# Patient Record
Sex: FEMALE | Race: BLACK OR AFRICAN AMERICAN | NOT HISPANIC OR LATINO | Employment: FULL TIME | URBAN - METROPOLITAN AREA
[De-identification: names, ages, dates, MRNs, and addresses within clinical notes are randomized per-mention and may not be internally consistent; named-entity substitution may affect disease eponyms.]

---

## 2020-09-28 ENCOUNTER — TELEPHONE (OUTPATIENT)
Dept: OBSTETRICS AND GYNECOLOGY | Facility: CLINIC | Age: 25
End: 2020-09-28

## 2020-09-28 DIAGNOSIS — Z36.3 ENCOUNTER FOR ANTENATAL SCREENING FOR MALFORMATION USING ULTRASOUND: Primary | ICD-10-CM

## 2020-09-28 NOTE — TELEPHONE ENCOUNTER
----- Message from Jean-Pierre Jeffrey sent at 9/28/2020 10:12 AM CDT -----  Who Called: STELLA PONCE [[id number]]     Date of Positive Preg Test: 09/28/20     1st day of Last Menstrual Cycle:08/01/2020     List Any Difficulties: vomiting      What Number to Call Back: [phone]

## 2021-11-16 ENCOUNTER — OFFICE VISIT (OUTPATIENT)
Dept: URGENT CARE | Facility: CLINIC | Age: 26
End: 2021-11-16
Payer: MEDICAID

## 2021-11-16 VITALS
OXYGEN SATURATION: 98 % | WEIGHT: 293 LBS | TEMPERATURE: 99 F | HEIGHT: 62 IN | SYSTOLIC BLOOD PRESSURE: 126 MMHG | BODY MASS INDEX: 53.92 KG/M2 | RESPIRATION RATE: 18 BRPM | HEART RATE: 66 BPM | DIASTOLIC BLOOD PRESSURE: 83 MMHG

## 2021-11-16 DIAGNOSIS — Z20.2 EXPOSURE TO STD: Primary | ICD-10-CM

## 2021-11-16 DIAGNOSIS — Z20.2 CHLAMYDIA CONTACT: ICD-10-CM

## 2021-11-16 DIAGNOSIS — Z20.2 STD EXPOSURE: ICD-10-CM

## 2021-11-16 DIAGNOSIS — J30.89 ALLERGIC RHINITIS DUE TO OTHER ALLERGIC TRIGGER, UNSPECIFIED SEASONALITY: ICD-10-CM

## 2021-11-16 LAB
B-HCG UR QL: NEGATIVE
BILIRUB UR QL STRIP: NEGATIVE
CTP QC/QA: YES
GLUCOSE UR QL STRIP: NEGATIVE
KETONES UR QL STRIP: NEGATIVE
LEUKOCYTE ESTERASE UR QL STRIP: NEGATIVE
PH, POC UA: 6.5
POC BLOOD, URINE: NEGATIVE
POC NITRATES, URINE: NEGATIVE
PROT UR QL STRIP: NEGATIVE
SP GR UR STRIP: 1.02 (ref 1–1.03)
UROBILINOGEN UR STRIP-ACNC: NORMAL (ref 0.1–1.1)

## 2021-11-16 PROCEDURE — 81003 POCT URINALYSIS, DIPSTICK, AUTOMATED, W/O SCOPE: ICD-10-PCS | Mod: QW,S$GLB,,

## 2021-11-16 PROCEDURE — 87591 N.GONORRHOEAE DNA AMP PROB: CPT | Mod: 59

## 2021-11-16 PROCEDURE — 81003 URINALYSIS AUTO W/O SCOPE: CPT | Mod: QW,S$GLB,,

## 2021-11-16 PROCEDURE — 81025 URINE PREGNANCY TEST: CPT | Mod: S$GLB,,,

## 2021-11-16 PROCEDURE — 87491 CHLMYD TRACH DNA AMP PROBE: CPT | Mod: 59

## 2021-11-16 PROCEDURE — 99203 OFFICE O/P NEW LOW 30 MIN: CPT | Mod: 25,S$GLB,,

## 2021-11-16 PROCEDURE — 99203 PR OFFICE/OUTPT VISIT, NEW, LEVL III, 30-44 MIN: ICD-10-PCS | Mod: 25,S$GLB,,

## 2021-11-16 PROCEDURE — 87481 CANDIDA DNA AMP PROBE: CPT | Mod: 59

## 2021-11-16 PROCEDURE — 81025 POCT URINE PREGNANCY: ICD-10-PCS | Mod: S$GLB,,,

## 2021-11-16 RX ORDER — DOXYCYCLINE 100 MG/1
100 CAPSULE ORAL 2 TIMES DAILY
Qty: 14 CAPSULE | Refills: 0 | Status: SHIPPED | OUTPATIENT
Start: 2021-11-16 | End: 2021-11-23

## 2021-11-16 RX ORDER — CEFTRIAXONE 500 MG/1
500 INJECTION, POWDER, FOR SOLUTION INTRAMUSCULAR; INTRAVENOUS
Status: COMPLETED | OUTPATIENT
Start: 2021-11-16 | End: 2021-11-16

## 2021-11-16 RX ORDER — BENZONATATE 200 MG/1
200 CAPSULE ORAL 3 TIMES DAILY PRN
Qty: 30 CAPSULE | Refills: 0 | Status: SHIPPED | OUTPATIENT
Start: 2021-11-16 | End: 2021-11-26

## 2021-11-16 RX ORDER — FLUTICASONE PROPIONATE 50 MCG
1 SPRAY, SUSPENSION (ML) NASAL DAILY
Qty: 9.9 ML | Refills: 0 | Status: SHIPPED | OUTPATIENT
Start: 2021-11-16 | End: 2021-12-16

## 2021-11-16 RX ADMIN — CEFTRIAXONE 500 MG: 500 INJECTION, POWDER, FOR SOLUTION INTRAMUSCULAR; INTRAVENOUS at 10:11

## 2021-11-18 LAB
C TRACH DNA SPEC QL NAA+PROBE: NOT DETECTED
N GONORRHOEA DNA SPEC QL NAA+PROBE: NOT DETECTED

## 2021-11-20 ENCOUNTER — TELEPHONE (OUTPATIENT)
Dept: URGENT CARE | Facility: CLINIC | Age: 26
End: 2021-11-20
Payer: MEDICAID

## 2021-11-22 LAB
BACTERIAL VAGINOSIS DNA: POSITIVE
CANDIDA GLABRATA DNA: NEGATIVE
CANDIDA KRUSEI DNA: NEGATIVE
CANDIDA RRNA VAG QL PROBE: NEGATIVE
T VAGINALIS RRNA GENITAL QL PROBE: NEGATIVE

## 2021-11-24 ENCOUNTER — TELEPHONE (OUTPATIENT)
Dept: URGENT CARE | Facility: CLINIC | Age: 26
End: 2021-11-24
Payer: MEDICAID

## 2021-12-06 ENCOUNTER — TELEPHONE (OUTPATIENT)
Dept: URGENT CARE | Facility: CLINIC | Age: 26
End: 2021-12-06
Payer: MEDICAID

## 2021-12-06 DIAGNOSIS — N76.0 BV (BACTERIAL VAGINOSIS): Primary | ICD-10-CM

## 2021-12-06 DIAGNOSIS — B96.89 BV (BACTERIAL VAGINOSIS): Primary | ICD-10-CM

## 2021-12-06 RX ORDER — METRONIDAZOLE 500 MG/1
500 TABLET ORAL EVERY 12 HOURS
Qty: 14 TABLET | Refills: 0 | Status: SHIPPED | OUTPATIENT
Start: 2021-12-06 | End: 2021-12-13

## 2022-11-03 ENCOUNTER — OFFICE VISIT (OUTPATIENT)
Dept: PRIMARY CARE CLINIC | Facility: CLINIC | Age: 27
End: 2022-11-03
Payer: MEDICAID

## 2022-11-03 ENCOUNTER — LAB VISIT (OUTPATIENT)
Dept: PRIMARY CARE CLINIC | Facility: CLINIC | Age: 27
End: 2022-11-03
Payer: MEDICAID

## 2022-11-03 VITALS
WEIGHT: 293 LBS | SYSTOLIC BLOOD PRESSURE: 134 MMHG | BODY MASS INDEX: 53.92 KG/M2 | DIASTOLIC BLOOD PRESSURE: 90 MMHG | HEIGHT: 62 IN | HEART RATE: 64 BPM | OXYGEN SATURATION: 99 %

## 2022-11-03 DIAGNOSIS — Z13.29 SCREENING FOR THYROID DISORDER: ICD-10-CM

## 2022-11-03 DIAGNOSIS — R53.83 FATIGUE, UNSPECIFIED TYPE: ICD-10-CM

## 2022-11-03 DIAGNOSIS — N92.6 MISSED MENSES: Primary | ICD-10-CM

## 2022-11-03 DIAGNOSIS — F41.0 PANIC ATTACKS: ICD-10-CM

## 2022-11-03 DIAGNOSIS — F32.A ANXIETY AND DEPRESSION: ICD-10-CM

## 2022-11-03 DIAGNOSIS — F41.9 ANXIETY AND DEPRESSION: ICD-10-CM

## 2022-11-03 DIAGNOSIS — N92.6 MISSED MENSES: ICD-10-CM

## 2022-11-03 LAB — HCG INTACT+B SERPL-ACNC: NORMAL MIU/ML

## 2022-11-03 PROCEDURE — 1159F PR MEDICATION LIST DOCUMENTED IN MEDICAL RECORD: ICD-10-PCS | Mod: CPTII,,, | Performed by: NURSE PRACTITIONER

## 2022-11-03 PROCEDURE — 99213 OFFICE O/P EST LOW 20 MIN: CPT | Mod: PBBFAC,PN | Performed by: NURSE PRACTITIONER

## 2022-11-03 PROCEDURE — 99999 PR PBB SHADOW E&M-EST. PATIENT-LVL III: ICD-10-PCS | Mod: PBBFAC,,, | Performed by: NURSE PRACTITIONER

## 2022-11-03 PROCEDURE — 84702 CHORIONIC GONADOTROPIN TEST: CPT | Performed by: NURSE PRACTITIONER

## 2022-11-03 PROCEDURE — 99999 PR PBB SHADOW E&M-EST. PATIENT-LVL III: CPT | Mod: PBBFAC,,, | Performed by: NURSE PRACTITIONER

## 2022-11-03 PROCEDURE — 3008F BODY MASS INDEX DOCD: CPT | Mod: CPTII,,, | Performed by: NURSE PRACTITIONER

## 2022-11-03 PROCEDURE — 1159F MED LIST DOCD IN RCRD: CPT | Mod: CPTII,,, | Performed by: NURSE PRACTITIONER

## 2022-11-03 PROCEDURE — 3075F SYST BP GE 130 - 139MM HG: CPT | Mod: CPTII,,, | Performed by: NURSE PRACTITIONER

## 2022-11-03 PROCEDURE — 3075F PR MOST RECENT SYSTOLIC BLOOD PRESS GE 130-139MM HG: ICD-10-PCS | Mod: CPTII,,, | Performed by: NURSE PRACTITIONER

## 2022-11-03 PROCEDURE — 99213 OFFICE O/P EST LOW 20 MIN: CPT | Mod: S$PBB,,, | Performed by: NURSE PRACTITIONER

## 2022-11-03 PROCEDURE — 3080F PR MOST RECENT DIASTOLIC BLOOD PRESSURE >= 90 MM HG: ICD-10-PCS | Mod: CPTII,,, | Performed by: NURSE PRACTITIONER

## 2022-11-03 PROCEDURE — 99213 PR OFFICE/OUTPT VISIT, EST, LEVL III, 20-29 MIN: ICD-10-PCS | Mod: S$PBB,,, | Performed by: NURSE PRACTITIONER

## 2022-11-03 PROCEDURE — 3008F PR BODY MASS INDEX (BMI) DOCUMENTED: ICD-10-PCS | Mod: CPTII,,, | Performed by: NURSE PRACTITIONER

## 2022-11-03 PROCEDURE — 3080F DIAST BP >= 90 MM HG: CPT | Mod: CPTII,,, | Performed by: NURSE PRACTITIONER

## 2022-11-03 RX ORDER — SERTRALINE HYDROCHLORIDE 25 MG/1
25 TABLET, FILM COATED ORAL DAILY
Qty: 30 TABLET | Refills: 3 | Status: SHIPPED | OUTPATIENT
Start: 2022-11-03 | End: 2024-03-01

## 2022-11-03 NOTE — PROGRESS NOTES
Subjective:       Patient ID: Aida Buchanan is a 27 y.o. female.    Chief Complaint: Anxiety (/) and Depression (/)    Ms. Aida Buchanan is a 27 year old female, new to me, presents to the clinic with missed menses.  NO PCP. Medical and surgical history in addition to problem list reviewed as listed below.    Presents missed menses, LMP September 15, 2022.  No PCP.  No gynecologist.  Last seen by gynecologist a year ago.  1-year-old son.  Self-employed [employer].  Lives with sister alone with son, son in .  Lost mother 2019.  Patient states she is not interested in pregnancy.  Plans to go to [place] terminate pregnancy.              Anxiety  Presents for initial visit. Onset was 1 to 5 years ago. The problem has been gradually worsening. Symptoms include chest pain, decreased concentration, depressed mood, dry mouth, excessive worry, hyperventilation, insomnia, irritability, nervous/anxious behavior, palpitations, panic and shortness of breath. Patient reports no compulsions or suicidal ideas. Symptoms occur most days. The severity of symptoms is interfering with daily activities and causing significant distress. Exacerbated by: son's father. The patient sleeps 10 hours per night. The quality of sleep is good. Nighttime awakenings: one to two.     Risk factors include alcohol intake and illicit drug use. Her past medical history is significant for anxiety/panic attacks, depression and suicide attempts (pills taken at age 16, twice). There is no history of asthma. Past treatments include nothing.   DepressionPatient presents with the following symptoms: decreased concentration, depressed mood, dry mouth, excessive worry, hyperventilation, insomnia, irritability, nervousness/anxiety, palpitations, panic and shortness of breath.  Patient is not experiencing: compulsions and suicidal ideas.    Patient has a history of: anxiety/panic attacks  No history of: asthma      Past Medical History:   Diagnosis Date     Hypertension         History reviewed. No pertinent surgical history.     History reviewed. No pertinent family history.    Social History     Tobacco Use   Smoking Status Never   Smokeless Tobacco Never       Social History     Social History Narrative    Not on file       Review of patient's allergies indicates:  No Known Allergies     Review of Systems   Constitutional:  Positive for irritability.   Respiratory:  Positive for shortness of breath.    Cardiovascular:  Positive for chest pain and palpitations.   Psychiatric/Behavioral:  Positive for decreased concentration and depression. Negative for suicidal ideas. The patient is nervous/anxious and has insomnia.        Objective:        Vitals:    11/03/22 1040   BP: (!) 134/90   Pulse: 64        Physical Exam  Constitutional:       Appearance: Normal appearance.   Pulmonary:      Effort: Pulmonary effort is normal.   Neurological:      Mental Status: She is alert and oriented to person, place, and time.   Psychiatric:         Attention and Perception: Attention normal.         Mood and Affect: Mood is anxious and depressed. Affect is tearful.         Speech: Speech normal.         Behavior: Behavior is agitated. Behavior is cooperative.       Assessment:       1. Missed menses    2. Anxiety and depression    3. Panic attacks    4. Screening for thyroid disorder    5. Fatigue, unspecified type        Plan:       Missed menses  -     HCG, QUANTITATIVE, PREGNANCY; Future; Expected date: 11/03/2022    Anxiety and depression  -     Ambulatory referral/consult to Psychiatry; Future; Expected date: 11/10/2022    Panic attacks  -     Ambulatory referral/consult to Psychiatry; Future; Expected date: 11/10/2022    Screening for thyroid disorder  -     TSH; Future; Expected date: 11/03/2022  -     T4, Free; Future; Expected date: 11/03/2022    Fatigue, unspecified type  -     Vitamin D; Future; Expected date: 11/03/2022  -     CBC Auto Differential; Future; Expected date:  11/03/2022  -     Comprehensive Metabolic Panel; Future; Expected date: 11/03/2022    Other orders  -     sertraline (ZOLOFT) 25 MG tablet; Take 1 tablet (25 mg total) by mouth once daily.  Dispense: 30 tablet; Refill: 3    Expected date of delivery June 22, 2023.    Plans to terminate pregnancy.    Scheduled with Psychiatry.    Meditate.  Relaxation Techniques.  Limit alcohol and caffeine.  Establish care with PCP.   Labs pending.              Follow up in about 18 days (around 11/21/2022) for Suzette Ramirez, MSN, APRN, FNP-C.    NEREYDA Brand, MSN, FNP-C

## 2022-11-04 DIAGNOSIS — Z3A.01 LESS THAN 8 WEEKS GESTATION OF PREGNANCY: Primary | ICD-10-CM

## 2023-09-05 ENCOUNTER — OFFICE VISIT (OUTPATIENT)
Dept: URGENT CARE | Facility: CLINIC | Age: 28
End: 2023-09-05
Payer: MEDICAID

## 2023-09-05 VITALS
HEIGHT: 60 IN | OXYGEN SATURATION: 98 % | RESPIRATION RATE: 18 BRPM | SYSTOLIC BLOOD PRESSURE: 122 MMHG | TEMPERATURE: 99 F | BODY MASS INDEX: 57.52 KG/M2 | HEART RATE: 68 BPM | WEIGHT: 293 LBS | DIASTOLIC BLOOD PRESSURE: 84 MMHG

## 2023-09-05 DIAGNOSIS — J06.9 URI, ACUTE: ICD-10-CM

## 2023-09-05 DIAGNOSIS — J06.9 UPPER RESPIRATORY TRACT INFECTION, UNSPECIFIED TYPE: Primary | ICD-10-CM

## 2023-09-05 LAB
CTP QC/QA: YES
SARS-COV-2 AG RESP QL IA.RAPID: NEGATIVE

## 2023-09-05 PROCEDURE — 99213 PR OFFICE/OUTPT VISIT, EST, LEVL III, 20-29 MIN: ICD-10-PCS | Mod: S$GLB,,, | Performed by: FAMILY MEDICINE

## 2023-09-05 PROCEDURE — 87811 SARS CORONAVIRUS 2 ANTIGEN POCT, MANUAL READ: ICD-10-PCS | Mod: QW,S$GLB,, | Performed by: FAMILY MEDICINE

## 2023-09-05 PROCEDURE — 87811 SARS-COV-2 COVID19 W/OPTIC: CPT | Mod: QW,S$GLB,, | Performed by: FAMILY MEDICINE

## 2023-09-05 PROCEDURE — 99213 OFFICE O/P EST LOW 20 MIN: CPT | Mod: S$GLB,,, | Performed by: FAMILY MEDICINE

## 2023-09-05 RX ORDER — ALBUTEROL SULFATE 90 UG/1
AEROSOL, METERED RESPIRATORY (INHALATION)
COMMUNITY
Start: 2023-05-14

## 2023-09-05 NOTE — PATIENT INSTRUCTIONS
TAKE MUCINEX  ALSO TAKE CLARITIN-D OR ALLEGRA-D.    PLENTY FLUIDS.     CONTINUE SALT WATER GARGLES.     TYLENOL OR ADVIL FOR DISCOMFORT.   I AM DISPENSING 4 PREDNISONE 20 MG PILLS. TAKE 2 TODAY, 1 TOMORROW, AND 1 ON THURSDAY

## 2023-09-05 NOTE — PROGRESS NOTES
Subjective:      Patient ID: Aida Buchanan is a 28 y.o. female.    Vitals:  height is 5' (1.524 m) and weight is 143.8 kg (317 lb) (abnormal). Her oral temperature is 98.7 °F (37.1 °C). Her blood pressure is 122/84 and her pulse is 68. Her respiration is 18 and oxygen saturation is 98%.     Chief Complaint: No chief complaint on file.    Pt presents to  for Coughing up green mucus, headache & fatigue x 1-2 weeks  No meds  D/C mostly from sinuses. No fever. No facial pain. Some pain with swallowing. No ear pain. Son sick with URI.     HX HTN, BUT ONLY WHEN PREGNANT.     URI   This is a new problem. The current episode started 1 to 4 weeks ago. The problem has been waxing and waning. There has been no fever. Associated symptoms include congestion, coughing, headaches, a plugged ear sensation, rhinorrhea, sneezing, a sore throat and wheezing. She has tried nothing for the symptoms. The treatment provided no relief.     HENT:  Positive for congestion, sore throat and trouble swallowing.    Respiratory:  Positive for cough and wheezing.    Allergic/Immunologic: Positive for sneezing.   Neurological:  Positive for headaches.    Objective:     Physical Exam   Constitutional: She is oriented to person, place, and time. She appears well-developed. She is cooperative.  Non-toxic appearance. She does not appear ill. No distress.   HENT:   Head: Normocephalic and atraumatic.   Ears:   Right Ear: Hearing, tympanic membrane, external ear and ear canal normal.   Left Ear: Hearing, tympanic membrane, external ear and ear canal normal.   Nose: Nose normal. No mucosal edema, rhinorrhea or nasal deformity. No epistaxis. Right sinus exhibits no maxillary sinus tenderness and no frontal sinus tenderness. Left sinus exhibits no maxillary sinus tenderness and no frontal sinus tenderness.   Mouth/Throat: Uvula is midline, oropharynx is clear and moist and mucous membranes are normal. No trismus in the jaw. Normal dentition. No uvula  swelling. No oropharyngeal exudate, posterior oropharyngeal edema or posterior oropharyngeal erythema.   Eyes: Conjunctivae and lids are normal. No scleral icterus.   Neck: Trachea normal and phonation normal. Neck supple. No edema present. No erythema present. No neck rigidity present.   Cardiovascular: Normal rate, regular rhythm, normal heart sounds and normal pulses.   Pulmonary/Chest: Effort normal and breath sounds normal. No respiratory distress. She has no decreased breath sounds. She has no rhonchi.   Abdominal: Normal appearance.   Musculoskeletal: Normal range of motion.         General: No deformity. Normal range of motion.   Neurological: She is alert and oriented to person, place, and time. She exhibits normal muscle tone. Coordination normal.   Skin: Skin is warm, dry, intact, not diaphoretic and not pale.   Psychiatric: Her speech is normal and behavior is normal. Judgment and thought content normal.   Nursing note and vitals reviewed.    Assessment:     1. Upper respiratory tract infection, unspecified type        Plan:       Upper respiratory tract infection, unspecified type  -     SARS Coronavirus 2 Antigen, POCT Manual Read

## 2023-10-12 ENCOUNTER — OFFICE VISIT (OUTPATIENT)
Dept: URGENT CARE | Facility: CLINIC | Age: 28
End: 2023-10-12
Payer: MEDICAID

## 2023-10-12 VITALS
HEIGHT: 60 IN | RESPIRATION RATE: 18 BRPM | HEART RATE: 73 BPM | BODY MASS INDEX: 57.52 KG/M2 | OXYGEN SATURATION: 99 % | WEIGHT: 293 LBS | SYSTOLIC BLOOD PRESSURE: 116 MMHG | TEMPERATURE: 98 F | DIASTOLIC BLOOD PRESSURE: 74 MMHG

## 2023-10-12 DIAGNOSIS — N89.8 VAGINAL ODOR: ICD-10-CM

## 2023-10-12 DIAGNOSIS — N30.01 ACUTE CYSTITIS WITH HEMATURIA: Primary | ICD-10-CM

## 2023-10-12 LAB
B-HCG UR QL: NEGATIVE
BILIRUB UR QL STRIP: NEGATIVE
CLARITY UR: CLEAR
COLOR URINE: YELLOW
CTP QC/QA: YES
GLUCOSE UR QL STRIP: NEGATIVE
KETONES UR QL STRIP: NEGATIVE
LEUKOCYTE ESTERASE UR QL STRIP: POSITIVE
PH, POC UA: 5.5
POC BLOOD, URINE: POSITIVE
POC NITRATES, URINE: NEGATIVE
PROT UR QL STRIP: NEGATIVE
SP GR UR STRIP: 1.02 (ref 1–1.03)
UROBILINOGEN UR STRIP-ACNC: 1 (ref 0.1–1.1)

## 2023-10-12 PROCEDURE — 87591 N.GONORRHOEAE DNA AMP PROB: CPT | Performed by: NURSE PRACTITIONER

## 2023-10-12 PROCEDURE — 81025 POCT URINE PREGNANCY: ICD-10-PCS | Mod: S$GLB,,, | Performed by: NURSE PRACTITIONER

## 2023-10-12 PROCEDURE — 81003 URINALYSIS AUTO W/O SCOPE: CPT | Mod: QW,S$GLB,, | Performed by: NURSE PRACTITIONER

## 2023-10-12 PROCEDURE — 81025 URINE PREGNANCY TEST: CPT | Mod: S$GLB,,, | Performed by: NURSE PRACTITIONER

## 2023-10-12 PROCEDURE — 81514 NFCT DS BV&VAGINITIS DNA ALG: CPT | Performed by: NURSE PRACTITIONER

## 2023-10-12 PROCEDURE — 99213 PR OFFICE/OUTPT VISIT, EST, LEVL III, 20-29 MIN: ICD-10-PCS | Mod: S$GLB,,, | Performed by: NURSE PRACTITIONER

## 2023-10-12 PROCEDURE — 81003 POCT URINALYSIS, DIPSTICK, AUTOMATED, W/O SCOPE: ICD-10-PCS | Mod: QW,S$GLB,, | Performed by: NURSE PRACTITIONER

## 2023-10-12 PROCEDURE — 99213 OFFICE O/P EST LOW 20 MIN: CPT | Mod: S$GLB,,, | Performed by: NURSE PRACTITIONER

## 2023-10-12 RX ORDER — NITROFURANTOIN 25; 75 MG/1; MG/1
100 CAPSULE ORAL 2 TIMES DAILY
Qty: 10 CAPSULE | Refills: 0 | Status: SHIPPED | OUTPATIENT
Start: 2023-10-12 | End: 2023-10-17

## 2023-10-12 RX ORDER — ALBUTEROL SULFATE 90 UG/1
1-2 AEROSOL, METERED RESPIRATORY (INHALATION) EVERY 6 HOURS PRN
COMMUNITY
Start: 2023-05-14 | End: 2024-03-01

## 2023-10-12 NOTE — PROGRESS NOTES
Subjective:      Patient ID: Aida Buchanan is a 28 y.o. female.    Vitals:  height is 5' (1.524 m) and weight is 143.8 kg (317 lb) (abnormal). Her oral temperature is 98.1 °F (36.7 °C). Her blood pressure is 116/74 and her pulse is 73. Her respiration is 18 and oxygen saturation is 99%.     Chief Complaint: Urinary Tract Infection    Pt presents to  for uti w/ vaginal odor x 3 day  No meds taken  Provider note:  28 year old female presents to urgent care today for eval of vaginal discomfort. She states that she had an odor when she urinates and feels like she has to push a lot to urinate, but not much urine comes out. She also reports a hx of BV, she has not noticed much discharge.   She had sex with her ex on Monday and he is uncircumcised per patient.   She denies any rashes or lesions to area. Patient is not currently on her cycle at this time.     Urinary Tract Infection   This is a new problem. The current episode started in the past 7 days. The problem occurs every urination. The problem has been gradually worsening. The quality of the pain is described as shooting. The pain is at a severity of 7/10. The pain is moderate. There has been no fever. She is Sexually active. There is No history of pyelonephritis. Associated symptoms include frequency. She has tried nothing for the symptoms. The treatment provided no relief.       Genitourinary:  Positive for frequency, vaginal pain and vaginal odor.      Objective:     Physical Exam   Constitutional: She is oriented to person, place, and time. She appears well-developed.   HENT:   Head: Normocephalic and atraumatic.   Ears:   Right Ear: External ear normal.   Left Ear: External ear normal.   Nose: Nose normal. No nasal deformity. No epistaxis.   Mouth/Throat: Oropharynx is clear and moist and mucous membranes are normal.   Eyes: Lids are normal.   Neck: Trachea normal and phonation normal. Neck supple.   Cardiovascular: Normal pulses.   Pulmonary/Chest: Effort  normal.   Abdominal: Normal appearance and bowel sounds are normal. She exhibits no distension. Soft. There is no abdominal tenderness.   Genitourinary:         Comments: Offered to do pelvic exam today and the patient declines. She states she only wants to self swab.      Neurological: She is alert and oriented to person, place, and time.   Skin: Skin is warm, dry and intact.   Psychiatric: Her speech is normal and behavior is normal.   Nursing note and vitals reviewed.      Results for orders placed or performed in visit on 10/12/23   POCT urine pregnancy   Result Value Ref Range    POC Preg Test, Ur Negative Negative     Acceptable Yes      Results for orders placed or performed in visit on 10/12/23   POCT Urinalysis, Dipstick, Automated, W/O Scope   Result Value Ref Range    POC Blood, Urine Positive (A) Negative    POC Bilirubin, Urine Negative Negative    POC Urobilinogen, Urine 1.0 0.1 - 1.1    POC Ketones, Urine Negative Negative    POC Protein, Urine Negative Negative    POC Nitrates, Urine Negative Negative    POC Glucose, Urine Negative Negative    pH, UA 5.5     POC Specific Gravity, Urine 1.025 1.003 - 1.029    POC Leukocytes, Urine Positive (A) Negative    COLOR URINE Yellow     Clarity, UA Clear    POCT urine pregnancy   Result Value Ref Range    POC Preg Test, Ur Negative Negative     Acceptable Yes        1. Acute cystitis with hematuria    2. Vaginal odor        Plan:       Acute cystitis with hematuria  -     POCT Urinalysis, Dipstick, Automated, W/O Scope  -     POCT urine pregnancy  -     nitrofurantoin, macrocrystal-monohydrate, (MACROBID) 100 MG capsule; Take 1 capsule (100 mg total) by mouth 2 (two) times daily. for 5 days  Dispense: 10 capsule; Refill: 0    Vaginal odor  -     VAGINOSIS SCREEN BY DNA PROBE  -     C. trachomatis/N. gonorrhoeae by AMP DNA [facility]; Urine      -discussed with patient to avoid sexual activity until all results are received, will tx  UTI today. If any abnormal results on swab will need to send over appropriate meds. Pt agrees to plan.

## 2023-10-14 LAB
BACTERIAL VAGINOSIS DNA: NEGATIVE
C TRACH DNA SPEC QL NAA+PROBE: NOT DETECTED
CANDIDA GLABRATA DNA: NEGATIVE
CANDIDA KRUSEI DNA: NEGATIVE
CANDIDA RRNA VAG QL PROBE: NEGATIVE
N GONORRHOEA DNA SPEC QL NAA+PROBE: NOT DETECTED
T VAGINALIS RRNA GENITAL QL PROBE: NEGATIVE

## 2023-10-15 ENCOUNTER — PATIENT MESSAGE (OUTPATIENT)
Dept: URGENT CARE | Facility: CLINIC | Age: 28
End: 2023-10-15
Payer: MEDICAID

## 2023-10-16 ENCOUNTER — TELEPHONE (OUTPATIENT)
Dept: URGENT CARE | Facility: CLINIC | Age: 28
End: 2023-10-16
Payer: MEDICAID

## 2023-11-15 ENCOUNTER — OFFICE VISIT (OUTPATIENT)
Dept: URGENT CARE | Facility: CLINIC | Age: 28
End: 2023-11-15
Payer: MEDICAID

## 2023-11-15 VITALS
OXYGEN SATURATION: 100 % | BODY MASS INDEX: 57.52 KG/M2 | SYSTOLIC BLOOD PRESSURE: 120 MMHG | DIASTOLIC BLOOD PRESSURE: 75 MMHG | HEART RATE: 77 BPM | HEIGHT: 60 IN | RESPIRATION RATE: 18 BRPM | WEIGHT: 293 LBS | TEMPERATURE: 99 F

## 2023-11-15 DIAGNOSIS — J02.9 SORE THROAT: ICD-10-CM

## 2023-11-15 DIAGNOSIS — J06.9 VIRAL URI WITH COUGH: Primary | ICD-10-CM

## 2023-11-15 LAB
CTP QC/QA: YES
MOLECULAR STREP A: NEGATIVE

## 2023-11-15 PROCEDURE — 87651 STREP A DNA AMP PROBE: CPT | Mod: QW,S$GLB,, | Performed by: FAMILY MEDICINE

## 2023-11-15 PROCEDURE — 99213 PR OFFICE/OUTPT VISIT, EST, LEVL III, 20-29 MIN: ICD-10-PCS | Mod: 95,S$GLB,, | Performed by: FAMILY MEDICINE

## 2023-11-15 PROCEDURE — 99213 OFFICE O/P EST LOW 20 MIN: CPT | Mod: 95,S$GLB,, | Performed by: FAMILY MEDICINE

## 2023-11-15 PROCEDURE — 87651 POCT STREP A MOLECULAR: ICD-10-PCS | Mod: QW,S$GLB,, | Performed by: FAMILY MEDICINE

## 2023-11-15 RX ORDER — CETIRIZINE HYDROCHLORIDE 10 MG/1
10 TABLET ORAL DAILY
Qty: 30 TABLET | Refills: 0 | Status: SHIPPED | OUTPATIENT
Start: 2023-11-15 | End: 2023-12-15

## 2023-11-15 RX ORDER — GUAIFENESIN 600 MG/1
1200 TABLET, EXTENDED RELEASE ORAL 2 TIMES DAILY
Qty: 40 TABLET | Refills: 0 | Status: SHIPPED | OUTPATIENT
Start: 2023-11-15 | End: 2023-11-25

## 2023-11-15 RX ORDER — BENZONATATE 200 MG/1
200 CAPSULE ORAL 3 TIMES DAILY PRN
Qty: 30 CAPSULE | Refills: 0 | Status: SHIPPED | OUTPATIENT
Start: 2023-11-15 | End: 2023-11-25

## 2023-11-15 RX ORDER — FLUTICASONE PROPIONATE 50 MCG
1 SPRAY, SUSPENSION (ML) NASAL DAILY
Qty: 16 G | Refills: 0 | Status: SHIPPED | OUTPATIENT
Start: 2023-11-15 | End: 2024-03-01

## 2023-11-15 RX ORDER — CETIRIZINE HYDROCHLORIDE 10 MG/1
10 TABLET ORAL
COMMUNITY
Start: 2023-06-14 | End: 2024-03-01

## 2023-11-15 NOTE — PATIENT INSTRUCTIONS
General Discharge Instructions   PLEASE READ YOUR DISCHARGE INSTRUCTIONS ENTIRELY AS IT CONTAINS IMPORTANT INFORMATION.  If you were prescribed a narcotic or controlled medication, do not drive or operate heavy equipment or machinery while taking these medications.  If you were prescribed antibiotics, please take them to completion.  You must understand that you've received an Urgent Care treatment only and that you may be released before all your medical problems are known or treated. You, the patient, will arrange for follow up care as instructed.    OVER THE COUNTER RECOMMENDATIONS/SUGGESTIONS.    Make sure to stay well hydrated.    Use Nasal Saline to mechanically move any post nasal drip from your eustachian tube or from the back of your throat.    Use warm salt water gargles to ease your throat pain. Warm salt water gargles as needed for sore throat- 1/2 tsp salt to 1 cup warm water, gargle as desired.    Use an antihistamine such as Claritin, Zyrtec or Allegra to dry you out.    Use pseudoephedrine (behind the counter) to decongest. Pseudoephedrine 30 mg up to 240 mg /day. It can raise your blood pressure and give you palpitations.    Use mucinex (guaifenesin) to break up mucous up to 2400mg/day to loosen any mucous.    The mucinex DM pill has a cough suppressant that can be sedating. It can be used at night to stop the tickle at the back of your throat.    You can use Mucinex D (it has guaifenesin and a high dose of pseudoephedrine) in the mornings to help decongest.    Use Afrin in each nare for no longer than 3 days, as it is addictive. It can also dry out your mucous membranes and cause elevated blood pressure. This is especially useful if you are flying.    Use Flonase 1-2 sprays/nostril per day. It is a local acting steroid nasal spray, if you develop a bloody nose, stop using the medication immediately.    Sometimes Nyquil at night is beneficial to help you get some rest, however it is sedating and it  does have an antihistamine, and tylenol.    Honey is a natural cough suppressant that can be used.    Tylenol up to 4,000 mg a day is safe for short periods and can be used for body aches, pain, and fever. However in high doses and prolonged use it can cause liver irritation.    Ibuprofen is a non-steroidal anti-inflammatory that can be used for body aches, pain, and fever.However it can also cause stomach irritation if over used.     Follow up with your PCP or specialty clinic as instructed in the next 2-3 days if not improved or as needed. You can call [phone] to schedule an appointment with appropriate provider.      If you condition worsens, we recommend that you receive another evaluation at the emergency room immediately or contact your primary medical clinic's after hours call service to discuss your concerns.      Please return here or go to the Emergency Department for any concerns or worsening condition.   You can also call [phone] to schedule an appointment with the appropriate provider.    Please return here or go to the Emergency Department for any concerns or worsening of condition.    Thank you for choosing [facility]!    Our goal in the Urgent Care is to always provide outstanding medical care. You may receive a survey by mail or e-mail in the next week regarding your experience today. We would greatly appreciate you completing and returning the survey. Your feedback provides us with a way to recognize our staff who provide very good care, and it helps us learn how to improve when your experience was below our aspiration of excellence.      We appreciate you trusting us with your medical care. We hope you feel better soon. We will be happy to take care of you for all of your future medical needs.    Sincerely,    BRIDGETT Fan  Patient Instructions   PLEASE READ YOUR DISCHARGE INSTRUCTIONS ENTIRELY AS IT CONTAINS IMPORTANT INFORMATION.        Please drink plenty of  "fluids. You body needs increased water but other beverages may aid in comfort.  You will know that you have had enough water to be hydrated when your urine is clear or at least a very pale yellow. Nasal saline may help with removal of mucus as well.  Ibuprofen is preferred for aches and pains as well as fever reduction. If you do not have high blood pressure, then you may use a decongestant such as pseudoephedrine or one of the above medications that have the letter, "-D" following it.  Hot tea with honey can help with sore throats as the heat with reduce the inflammation and the honey will coat your throat to help it feel better. Prescription pain medicine should be used for the significant throat sxs you are experiencing. Do not drive after taking this medication.     Lastly, good hand washing and cough hygiene (cough into your elbow) will help prevent the spread of the illness.  A general rule is that you are no longer contagious once you have been without a fever for over 24 hours without requiring fever reducing medications.   Please get plenty of rest.     Please return here or go to the Emergency Department for any concerns or worsening of condition.     Please take an over the counter antihistamine medication (allegra/Claritin/Zyrtec) of your choice as directed, they may help with some of the runny nose symptoms if you are having them.       Can continue mucinex. Use mucinex (guaifenisin) to break up mucous up to 2400mg/day to loosen any mucous. The mucinex DM pill has a cough suppressant that can be used at night to stop the tickle at the back of your throat. You may use Mucinex to help thin thick secretions to allow you to expel them but it only works if you drink more water.     If not allergic, please take over the counter Tylenol (Acetaminophen) and/or Motrin (Ibuprofen) as directed for control of pain and/or fever.  Please follow up with your primary care doctor or specialist as needed.     Sore throat " recommendations: Warm fluids, warm salt water gargles, throat lozenges, tea, honey, soup, rest, hydration.     Use over the counter flonase: one spray each nostril twice daily OR two sprays each nostril once daily.      Sinus rinses DO NOT USE TAP WATER, if you must, water must be a rolling boil for 1 minute, let it cool, then use.  May use distilled water, or over the counter nasal saline rinses.  Vics vapor rub in shower to help open nasal passages.  May use nasal gel to keep passages moisturized.  May use Nasal saline sprays during the day for added relief of congestion.   For those who go to the gym, please do not use the sauna or steam room now to clear sinuses.     If you  smoke, please stop smoking.        Please return or see your primary care doctor if you develop new or worsening symptoms.      Please arrange follow up with your primary medical clinic as soon as possible. You must understand that you've received an Urgent Care treatment only and that you may be released before all of your medical problems are known or treated. You, the patient, will arrange for follow up as instructed. If your symptoms worsen or fail to improve you should go to the Emergency Room.

## 2023-11-15 NOTE — LETTER
November 15, 2023      Urgent Care - [facility]  [address]  [facility] [address]  Phone: [phone]       Patient: Aida Buchanan   YOB: 1995  Date of Visit: 11/15/2023    To Whom It May Concern:    Jalyn Buchanan  was at [facility] on 11/15/2023. The patient may return to work/school on 11/16/2023 with no restrictions. If you have any questions or concerns, or if I can be of further assistance, please do not hesitate to contact me.    Sincerely,    Marjorie Salazar NP

## 2023-11-15 NOTE — PROGRESS NOTES
Subjective:      Patient ID: Aida Buchanan is a 28 y.o. female.    Vitals:  height is 5' (1.524 m) and weight is 143.8 kg (317 lb) (abnormal). Her oral temperature is 99.2 °F (37.3 °C). Her blood pressure is 120/75 and her pulse is 77. Her respiration is 18 and oxygen saturation is 100%.     Chief Complaint: Sore Throat    Pt presents to  for cough & sore throat since October. Pt states her son was dx with strep on today.   Pt has been using otc cough syrup  Provider note begins below:  Pt reports she has a cough that started in beginning of October, she says her son was dx with strep today, she says the sore throat has been going on on since October. She says she feels like phlegm gets stuck in her throat.     Sore Throat   This is a new problem. The current episode started more than 1 month ago. The problem has been unchanged. Neither side of throat is experiencing more pain than the other. The pain is at a severity of 2/10. The pain is mild. Associated symptoms include congestion, coughing and a hoarse voice. Pertinent negatives include no abdominal pain, diarrhea, drooling, ear discharge, ear pain, headaches, plugged ear sensation, neck pain, shortness of breath, stridor, swollen glands, trouble swallowing or vomiting. She has had no exposure to strep or mono. Treatments tried: otc cough syrup. The treatment provided no relief.       Constitution: Negative for activity change, appetite change, chills, sweating, fatigue, fever and unexpected weight change.   HENT:  Positive for congestion and sore throat. Negative for ear pain, ear discharge, drooling and trouble swallowing.    Neck: Negative for neck pain.   Respiratory:  Positive for cough. Negative for shortness of breath and stridor.    Gastrointestinal:  Negative for abdominal pain, vomiting and diarrhea.   Neurological:  Negative for headaches.      Objective:     Physical Exam   Constitutional: She is oriented to person, place, and time. She appears  "well-developed.  Non-toxic appearance. She does not appear ill. No distress.      Comments:No fever, and a "hot potato" or muffled voice. There is no pooling of saliva or drooling present, there is no trismus.      obesity  HENT:   Head: Normocephalic and atraumatic.   Ears:   Right Ear: External ear normal.   Left Ear: External ear normal.   Nose: Nose normal.   Mouth/Throat: Uvula is midline and mucous membranes are normal. No trismus in the jaw. No uvula swelling. Cobblestoning present. No oropharyngeal exudate, posterior oropharyngeal edema, posterior oropharyngeal erythema or tonsillar abscesses. Tonsils are 2+ on the right. Tonsils are 2+ on the left. No tonsillar exudate.   Eyes: Conjunctivae, EOM and lids are normal. Pupils are equal, round, and reactive to light.   Neck: Trachea normal and phonation normal. Neck supple.   Pulmonary/Chest: Effort normal and breath sounds normal.   Musculoskeletal: Normal range of motion.         General: Normal range of motion.   Neurological: She is alert and oriented to person, place, and time.   Skin: Skin is warm, dry, intact and not diaphoretic.   Psychiatric: Her speech is normal and behavior is normal. Judgment and thought content normal.   Nursing note and vitals reviewed.    The patient location is: [facility]  The chief complaint leading to consultation is: sore throat and cough    Visit type: audiovisual    Face to Face time with patient: 10 minutes  15 minutes of total time spent on the encounter, which includes face to face time and non-face to face time preparing to see the patient (eg, review of tests), Obtaining and/or reviewing separately obtained history, Documenting clinical information in the electronic or other health record, Independently interpreting results (not separately reported) and communicating results to the patient/family/caregiver, or Care coordination (not separately reported).         Each patient to whom he or she provides medical services by " telemedicine is:  (1) informed of the relationship between the physician and patient and the respective role of any other health care provider with respect to management of the patient; and (2) notified that he or she may decline to receive medical services by telemedicine and may withdraw from such care at any time.      Assessment:     1. Viral URI with cough    2. Sore throat      Results for orders placed or performed in visit on 11/15/23   POCT Strep A, Molecular   Result Value Ref Range    Molecular Strep A, POC Negative Negative     Acceptable Yes       Plan:     Strep negative, tyto visit, advised patient to follow-up with me on Friday at [facility] and I will order a chest x-ray for further evaluation of the cough.  Reviewed antihistamines, cough medication with her to take in the meantime, strict ED precautions.  Declined rtw note  Vss, nad, lungs ctab    Discussed results/diagnosis/plan with patient in clinic. Strict precautions given to patient to monitor for worsening signs and symptoms. Advised to follow up with PCP or specialist.    Explained side effects of medications prescribed with patient and informed him/her to discontinue use if he/she has any side effects and to inform UC or PCP if this occurs. All questions answered. Strict ED verses clinic return precautions stressed and given in depth. Advised if symptoms worsens of fail to improve he/she should go to the Emergency Room. Discharge and follow-up instructions given verbally/printed with the patient who expressed understanding and willingness to comply with my recommendations. Patient voiced understanding and in agreement with current treatment plan. Patient exits the exam room in no acute distress. Conversant and engaged during discharge discussion, verbalized understanding.      Viral URI with cough  -     cetirizine (ZYRTEC) 10 MG tablet; Take 1 tablet (10 mg total) by mouth once daily.  Dispense: 30 tablet; Refill:  0  -     fluticasone propionate (FLONASE) 50 mcg/actuation nasal spray; 1 spray (50 mcg total) by Each Nostril route once daily.  Dispense: 16 g; Refill: 0  -     benzonatate (TESSALON) 200 MG capsule; Take 1 capsule (200 mg total) by mouth 3 (three) times daily as needed.  Dispense: 30 capsule; Refill: 0  -     guaiFENesin (MUCINEX) 600 mg 12 hr tablet; Take 2 tablets (1,200 mg total) by mouth 2 (two) times daily. for 10 days  Dispense: 40 tablet; Refill: 0    Sore throat  -     POCT Strep A, Molecular                Patient Instructions   General Discharge Instructions   PLEASE READ YOUR DISCHARGE INSTRUCTIONS ENTIRELY AS IT CONTAINS IMPORTANT INFORMATION.  If you were prescribed a narcotic or controlled medication, do not drive or operate heavy equipment or machinery while taking these medications.  If you were prescribed antibiotics, please take them to completion.  You must understand that you've received an Urgent Care treatment only and that you may be released before all your medical problems are known or treated. You, the patient, will arrange for follow up care as instructed.    OVER THE COUNTER RECOMMENDATIONS/SUGGESTIONS.    Make sure to stay well hydrated.    Use Nasal Saline to mechanically move any post nasal drip from your eustachian tube or from the back of your throat.    Use warm salt water gargles to ease your throat pain. Warm salt water gargles as needed for sore throat- 1/2 tsp salt to 1 cup warm water, gargle as desired.    Use an antihistamine such as Claritin, Zyrtec or Allegra to dry you out.    Use pseudoephedrine (behind the counter) to decongest. Pseudoephedrine 30 mg up to 240 mg /day. It can raise your blood pressure and give you palpitations.    Use mucinex (guaifenesin) to break up mucous up to 2400mg/day to loosen any mucous.    The mucinex DM pill has a cough suppressant that can be sedating. It can be used at night to stop the tickle at the back of your throat.    You can use  Mucinex D (it has guaifenesin and a high dose of pseudoephedrine) in the mornings to help decongest.    Use Afrin in each nare for no longer than 3 days, as it is addictive. It can also dry out your mucous membranes and cause elevated blood pressure. This is especially useful if you are flying.    Use Flonase 1-2 sprays/nostril per day. It is a local acting steroid nasal spray, if you develop a bloody nose, stop using the medication immediately.    Sometimes Nyquil at night is beneficial to help you get some rest, however it is sedating and it does have an antihistamine, and tylenol.    Honey is a natural cough suppressant that can be used.    Tylenol up to 4,000 mg a day is safe for short periods and can be used for body aches, pain, and fever. However in high doses and prolonged use it can cause liver irritation.    Ibuprofen is a non-steroidal anti-inflammatory that can be used for body aches, pain, and fever.However it can also cause stomach irritation if over used.     Follow up with your PCP or specialty clinic as instructed in the next 2-3 days if not improved or as needed. You can call [phone] to schedule an appointment with appropriate provider.      If you condition worsens, we recommend that you receive another evaluation at the emergency room immediately or contact your primary medical clinic's after hours call service to discuss your concerns.      Please return here or go to the Emergency Department for any concerns or worsening condition.   You can also call [phone] to schedule an appointment with the appropriate provider.    Please return here or go to the Emergency Department for any concerns or worsening of condition.    Thank you for choosing [facility]!    Our goal in the Urgent Care is to always provide outstanding medical care. You may receive a survey by mail or e-mail in the next week regarding your experience today. We would greatly appreciate you completing and  "returning the survey. Your feedback provides us with a way to recognize our staff who provide very good care, and it helps us learn how to improve when your experience was below our aspiration of excellence.      We appreciate you trusting us with your medical care. We hope you feel better soon. We will be happy to take care of you for all of your future medical needs.    Sincerely,    BRIDGETT Fan  Patient Instructions   PLEASE READ YOUR DISCHARGE INSTRUCTIONS ENTIRELY AS IT CONTAINS IMPORTANT INFORMATION.        Please drink plenty of fluids. You body needs increased water but other beverages may aid in comfort.  You will know that you have had enough water to be hydrated when your urine is clear or at least a very pale yellow. Nasal saline may help with removal of mucus as well.  Ibuprofen is preferred for aches and pains as well as fever reduction. If you do not have high blood pressure, then you may use a decongestant such as pseudoephedrine or one of the above medications that have the letter, "-D" following it.  Hot tea with honey can help with sore throats as the heat with reduce the inflammation and the honey will coat your throat to help it feel better. Prescription pain medicine should be used for the significant throat sxs you are experiencing. Do not drive after taking this medication.     Lastly, good hand washing and cough hygiene (cough into your elbow) will help prevent the spread of the illness.  A general rule is that you are no longer contagious once you have been without a fever for over 24 hours without requiring fever reducing medications.   Please get plenty of rest.     Please return here or go to the Emergency Department for any concerns or worsening of condition.     Please take an over the counter antihistamine medication (allegra/Claritin/Zyrtec) of your choice as directed, they may help with some of the runny nose symptoms if you are having them.       Can continue mucinex. Use " mucinex (guaifenisin) to break up mucous up to 2400mg/day to loosen any mucous. The mucinex DM pill has a cough suppressant that can be used at night to stop the tickle at the back of your throat. You may use Mucinex to help thin thick secretions to allow you to expel them but it only works if you drink more water.     If not allergic, please take over the counter Tylenol (Acetaminophen) and/or Motrin (Ibuprofen) as directed for control of pain and/or fever.  Please follow up with your primary care doctor or specialist as needed.     Sore throat recommendations: Warm fluids, warm salt water gargles, throat lozenges, tea, honey, soup, rest, hydration.     Use over the counter flonase: one spray each nostril twice daily OR two sprays each nostril once daily.      Sinus rinses DO NOT USE TAP WATER, if you must, water must be a rolling boil for 1 minute, let it cool, then use.  May use distilled water, or over the counter nasal saline rinses.  Vics vapor rub in shower to help open nasal passages.  May use nasal gel to keep passages moisturized.  May use Nasal saline sprays during the day for added relief of congestion.   For those who go to the gym, please do not use the sauna or steam room now to clear sinuses.     If you  smoke, please stop smoking.        Please return or see your primary care doctor if you develop new or worsening symptoms.      Please arrange follow up with your primary medical clinic as soon as possible. You must understand that you've received an Urgent Care treatment only and that you may be released before all of your medical problems are known or treated. You, the patient, will arrange for follow up as instructed. If your symptoms worsen or fail to improve you should go to the Emergency Room.

## 2024-01-19 ENCOUNTER — CLINICAL SUPPORT (OUTPATIENT)
Dept: URGENT CARE | Facility: CLINIC | Age: 29
End: 2024-01-19
Payer: MEDICAID

## 2024-01-19 ENCOUNTER — OFFICE VISIT (OUTPATIENT)
Dept: URGENT CARE | Facility: CLINIC | Age: 29
End: 2024-01-19
Payer: MEDICAID

## 2024-01-19 VITALS
HEART RATE: 85 BPM | TEMPERATURE: 99 F | WEIGHT: 293 LBS | HEIGHT: 60 IN | OXYGEN SATURATION: 99 % | SYSTOLIC BLOOD PRESSURE: 126 MMHG | RESPIRATION RATE: 18 BRPM | BODY MASS INDEX: 57.52 KG/M2 | DIASTOLIC BLOOD PRESSURE: 78 MMHG

## 2024-01-19 DIAGNOSIS — R51.9 INTRACTABLE HEADACHE, UNSPECIFIED CHRONICITY PATTERN, UNSPECIFIED HEADACHE TYPE: Primary | ICD-10-CM

## 2024-01-19 DIAGNOSIS — J10.1 INFLUENZA B: Primary | ICD-10-CM

## 2024-01-19 LAB
CTP QC/QA: YES
CTP QC/QA: YES
POC MOLECULAR INFLUENZA A AGN: NEGATIVE
POC MOLECULAR INFLUENZA B AGN: POSITIVE
SARS-COV-2 AG RESP QL IA.RAPID: NEGATIVE

## 2024-01-19 PROCEDURE — 87502 INFLUENZA DNA AMP PROBE: CPT | Mod: QW,S$GLB,, | Performed by: FAMILY MEDICINE

## 2024-01-19 PROCEDURE — 87811 SARS-COV-2 COVID19 W/OPTIC: CPT | Mod: QW,S$GLB,, | Performed by: FAMILY MEDICINE

## 2024-01-19 PROCEDURE — 99213 OFFICE O/P EST LOW 20 MIN: CPT | Mod: 95,S$GLB,, | Performed by: NURSE PRACTITIONER

## 2024-01-19 RX ORDER — NITROFURANTOIN 25; 75 MG/1; MG/1
CAPSULE ORAL
COMMUNITY
Start: 2023-10-12 | End: 2024-03-01

## 2024-01-19 RX ORDER — AZITHROMYCIN 250 MG/1
TABLET, FILM COATED ORAL 2 TIMES DAILY
COMMUNITY
Start: 2024-01-10 | End: 2024-03-01

## 2024-01-19 RX ORDER — OSELTAMIVIR PHOSPHATE 75 MG/1
75 CAPSULE ORAL 2 TIMES DAILY
Qty: 10 CAPSULE | Refills: 0 | Status: SHIPPED | OUTPATIENT
Start: 2024-01-19 | End: 2024-01-24

## 2024-01-19 NOTE — PATIENT INSTRUCTIONS
Start using claritin, zyrtec or benadryl to help with symptoms      You must understand that you've received an Urgent Care treatment only and that you may be released before all your medical problems are known or treated. You, the patient, will arrange for follow up care as instructed.  If your condition worsens we recommend that you receive another evaluation at the emergency room immediately or contact your primary medical clinics after hours call service to discuss your concerns.  Please return here or go to the Emergency Department for any concerns or worsening of condition.

## 2024-01-19 NOTE — PROGRESS NOTES
Subjective:      Patient ID: Aida Buchanan is a 28 y.o. female.    Vitals:  height is 5' (1.524 m) and weight is 143.8 kg (317 lb) (abnormal). Her oral temperature is 98.5 °F (36.9 °C). Her blood pressure is 126/78 and her pulse is 85. Her respiration is 18 and oxygen saturation is 99%.     Chief Complaint: Influenza    Pt presents to  initially for Nurse Visit - Pt tested Positive for Influenza B  Symptoms: Headache, body aches, runny nose, coughing & sneezing x1 day  No meds taken    Influenza  This is a new problem. The current episode started yesterday. The problem occurs constantly. The problem has been waxing and waning. Associated symptoms include chills, congestion, coughing, fatigue, headaches, myalgias, nausea and weakness. Nothing aggravates the symptoms. She has tried nothing for the symptoms. The treatment provided no relief.       Constitution: Positive for chills, fatigue and generalized weakness.   HENT:  Positive for congestion, postnasal drip, sinus pressure and voice change.    Respiratory:  Positive for cough.    Gastrointestinal:  Positive for nausea.   Musculoskeletal:  Positive for muscle ache.   Neurological:  Positive for headaches.      Objective:     Physical Exam   Constitutional: She is oriented to person, place, and time. No distress. normal  HENT:   Head: Normocephalic and atraumatic.   Mouth/Throat: Oropharynx is clear and moist and mucous membranes are normal.   Eyes: Conjunctivae are normal. No scleral icterus.   Pulmonary/Chest: Effort normal. No respiratory distress.   Musculoskeletal: Normal range of motion.         General: Normal range of motion.   Neurological: She is alert and oriented to person, place, and time.   Skin: Skin is not diaphoretic.   Psychiatric: Her behavior is normal. Judgment and thought content normal.   Vitals reviewed.    The patient location is: [facility]  The chief complaint leading to consultation is: URI    Visit type: audiovisual    Face  to Face time with patient: 15  20 minutes of total time spent on the encounter, which includes face to face time and non-face to face time preparing to see the patient (eg, review of tests), Obtaining and/or reviewing separately obtained history, Documenting clinical information in the electronic or other health record, Independently interpreting results (not separately reported) and communicating results to the patient/family/caregiver, or Care coordination (not separately reported).         Each patient to whom he or she provides medical services by telemedicine is:  (1) informed of the relationship between the physician and patient and the respective role of any other health care provider with respect to management of the patient; and (2) notified that he or she may decline to receive medical services by telemedicine and may withdraw from such care at any time.    Notes:   Results for orders placed or performed in visit on 01/19/24   SARS Coronavirus 2 Antigen, POCT Manual Read   Result Value Ref Range    SARS Coronavirus 2 Antigen Negative Negative     Acceptable Yes    POCT Influenza A/B MOLECULAR   Result Value Ref Range    POC Molecular Influenza A Ag Negative Negative, Not Reported    POC Molecular Influenza B Ag Positive (A) Negative, Not Reported     Acceptable Yes          Assessment:     1. Influenza B        Plan:       Influenza B  -     oseltamivir (TAMIFLU) 75 MG capsule; Take 1 capsule (75 mg total) by mouth 2 (two) times daily. for 5 days  Dispense: 10 capsule; Refill: 0                    Thank you for choosing [facility]!    Our goal in the [facility] is to always provide outstanding medical care. You may receive a survey by mail or e-mail in the next week regarding your experience today. We would greatly appreciate you completing and returning the survey. Your feedback provides us with a way to recognize our staff who provide  very good care, and it helps us learn how to improve when your experience was below our aspiration of excellence.         We appreciate you trusting us with your medical care. We hope you feel better soon. We will be happy to take care of you for all of your future medical needs.    You must understand that you've received an Urgent Care treatment only and that you may be released before all your medical problems are known or treated. You, the patient, will arrange for follow up care as instructed.    Follow up with your PCP or specialty clinic as directed in the next 1-2 weeks if not improved or as needed.  You can call [phone] to schedule an appointment with the appropriate provider.    If your condition worsens we recommend that you receive another evaluation in person, with your primary care provider, urgent care or at the emergency room immediately or contact your primary medical clinics after hours call service to discuss your concerns.

## 2024-03-01 ENCOUNTER — OFFICE VISIT (OUTPATIENT)
Dept: URGENT CARE | Facility: CLINIC | Age: 29
End: 2024-03-01
Payer: MEDICAID

## 2024-03-01 VITALS
HEART RATE: 73 BPM | RESPIRATION RATE: 18 BRPM | HEIGHT: 60 IN | OXYGEN SATURATION: 98 % | TEMPERATURE: 98 F | DIASTOLIC BLOOD PRESSURE: 72 MMHG | SYSTOLIC BLOOD PRESSURE: 112 MMHG | WEIGHT: 293 LBS | BODY MASS INDEX: 57.52 KG/M2

## 2024-03-01 DIAGNOSIS — R11.10 VOMITING, UNSPECIFIED VOMITING TYPE, UNSPECIFIED WHETHER NAUSEA PRESENT: Primary | ICD-10-CM

## 2024-03-01 DIAGNOSIS — E66.9 OBESITY, UNSPECIFIED CLASSIFICATION, UNSPECIFIED OBESITY TYPE, UNSPECIFIED WHETHER SERIOUS COMORBIDITY PRESENT: ICD-10-CM

## 2024-03-01 PROBLEM — F33.9 RECURRENT DEPRESSIVE DISORDER: Status: ACTIVE | Noted: 2017-11-08

## 2024-03-01 LAB
B-HCG UR QL: NEGATIVE
CTP QC/QA: YES
POC MOLECULAR INFLUENZA A AGN: NEGATIVE
POC MOLECULAR INFLUENZA B AGN: NEGATIVE
SARS-COV-2 AG RESP QL IA.RAPID: NEGATIVE

## 2024-03-01 PROCEDURE — 81025 URINE PREGNANCY TEST: CPT | Mod: S$GLB,,, | Performed by: FAMILY MEDICINE

## 2024-03-01 PROCEDURE — 99214 OFFICE O/P EST MOD 30 MIN: CPT | Mod: S$GLB,,, | Performed by: FAMILY MEDICINE

## 2024-03-01 PROCEDURE — 87811 SARS-COV-2 COVID19 W/OPTIC: CPT | Mod: QW,S$GLB,, | Performed by: FAMILY MEDICINE

## 2024-03-01 PROCEDURE — 87502 INFLUENZA DNA AMP PROBE: CPT | Mod: QW,S$GLB,, | Performed by: FAMILY MEDICINE

## 2024-03-01 RX ORDER — ONDANSETRON 8 MG/1
8 TABLET, ORALLY DISINTEGRATING ORAL
Status: COMPLETED | OUTPATIENT
Start: 2024-03-01 | End: 2024-03-01

## 2024-03-01 RX ORDER — ONDANSETRON 4 MG/1
8 TABLET, ORALLY DISINTEGRATING ORAL 2 TIMES DAILY
Qty: 20 TABLET | Refills: 0 | Status: SHIPPED | OUTPATIENT
Start: 2024-03-01

## 2024-03-01 RX ADMIN — ONDANSETRON 8 MG: 8 TABLET, ORALLY DISINTEGRATING ORAL at 08:03

## 2024-03-01 NOTE — PATIENT INSTRUCTIONS
Thank you for choosing [facility]!     Our goal in the Urgent Care is to always provide outstanding medical care. You may receive a survey by mail or e-mail in the next week regarding your experience today. We would greatly appreciate you completing and returning the survey. Your feedback provides us with a way to recognize our staff who provide very good care, and it helps us learn how to improve when your experience was below our aspiration of excellence.       We appreciate you trusting us with your medical care. We hope you feel better soon. We will be happy to take care of you for all of your future medical needs.  You must understand that you've received an Urgent Care treatment only and that you may be released before all your medical problems are known or treated. You, the patient, will arrange for follow up care as instructed.  Follow up with your PCP or specialty clinic as directed in the next 1-2 weeks if not improved or as needed.  You can call [phone] to schedule an appointment with the appropriate provider.  Another option is to follow up with [facility] ([email or web address]) virtually for quick simple medical advice.  If your condition worsens we recommend that you receive another evaluation at the emergency room immediately or contact your primary medical clinics after hours call service to discuss your concerns.  Please return here or go to the Emergency Department for any concerns or worsening of condition.      *If you were prescribed a narcotic or controlled medication, do not drive or operate heavy equipment or machinery while taking these medications.    
normal...

## 2024-03-01 NOTE — LETTER
March 1, 2024      Urgent Care - [facility]  [address]  [facility] [address]  Phone: [phone]       Patient: Aida Buchanna   YOB: 1995  Date of Visit: 03/01/2024    To Whom It May Concern:    Jalyn Buchanan  was at [facility] on 03/01/2024. The patient may return to work/school on 3/4/24 with no restrictions. If you have any questions or concerns, or if I can be of further assistance, please do not hesitate to contact me.    Sincerely,      Galileo Cabrera MD

## 2024-03-01 NOTE — PROGRESS NOTES
Subjective:      Patient ID: Aida Buchanan is a 28 y.o. female.    Vitals:  height is 5' (1.524 m) and weight is 143.8 kg (317 lb) (abnormal). Her oral temperature is 98.2 °F (36.8 °C). Her blood pressure is 112/72 and her pulse is 73. Her respiration is 18 and oxygen saturation is 98%.     Chief Complaint: Emesis    Pt presents to  w/ vomiting, diarrhea, chills & fatigue since last night.  No meds taken  Actively vomiting bile in clinic  Had fajitas yesterday  Took home pregnancy test and was negative    Emesis   This is a new problem. The current episode started yesterday. The problem occurs 5 to 10 times per day. The problem has been rapidly worsening. The emesis has an appearance of bile and projectile. There has been no fever. Associated symptoms include abdominal pain, coughing, diarrhea, dizziness and sweats. She has tried nothing for the symptoms. The treatment provided no relief.       Respiratory:  Positive for cough.    Gastrointestinal:  Positive for abdominal pain, vomiting and diarrhea.   Neurological:  Positive for dizziness.    Objective:     Physical Exam   Constitutional: She is oriented to person, place, and time. She appears well-developed. No distress. obesity  HENT:   Head: Normocephalic and atraumatic.   Eyes: Conjunctivae are normal. No scleral icterus.   Cardiovascular: Normal rate.   Pulmonary/Chest: Effort normal. No respiratory distress.   Abdominal: Bowel sounds are normal. She exhibits no distension. Soft.   Musculoskeletal: Normal range of motion.         General: Normal range of motion.   Neurological: She is alert and oriented to person, place, and time.   Skin: Skin is warm, dry and not diaphoretic.   Psychiatric: Her behavior is normal. Judgment and thought content normal.   Vitals reviewed.      Assessment:     1. Vomiting, unspecified vomiting type, unspecified whether nausea present        Plan:       Vomiting, unspecified vomiting type, unspecified whether nausea present  -      POCT Influenza A/B MOLECULAR

## 2024-05-01 ENCOUNTER — OFFICE VISIT (OUTPATIENT)
Dept: URGENT CARE | Facility: CLINIC | Age: 29
End: 2024-05-01
Payer: MEDICAID

## 2024-05-01 VITALS
OXYGEN SATURATION: 99 % | DIASTOLIC BLOOD PRESSURE: 79 MMHG | SYSTOLIC BLOOD PRESSURE: 119 MMHG | WEIGHT: 293 LBS | HEIGHT: 60 IN | RESPIRATION RATE: 18 BRPM | TEMPERATURE: 98 F | HEART RATE: 67 BPM | BODY MASS INDEX: 57.52 KG/M2

## 2024-05-01 DIAGNOSIS — H10.9 CONJUNCTIVITIS OF RIGHT EYE, UNSPECIFIED CONJUNCTIVITIS TYPE: ICD-10-CM

## 2024-05-01 DIAGNOSIS — H10.9 CONJUNCTIVITIS OF LEFT EYE, UNSPECIFIED CONJUNCTIVITIS TYPE: ICD-10-CM

## 2024-05-01 DIAGNOSIS — H57.89 EYE DRAINAGE: Primary | ICD-10-CM

## 2024-05-01 DIAGNOSIS — Z86.69 HISTORY OF ITCHING OF EYE: ICD-10-CM

## 2024-05-01 PROCEDURE — 99213 OFFICE O/P EST LOW 20 MIN: CPT | Mod: S$GLB,,, | Performed by: NURSE PRACTITIONER

## 2024-05-01 RX ORDER — POLYMYXIN B SULFATE AND TRIMETHOPRIM 1; 10000 MG/ML; [USP'U]/ML
1 SOLUTION OPHTHALMIC EVERY 4 HOURS
Qty: 10 ML | Refills: 0 | Status: SHIPPED | OUTPATIENT
Start: 2024-05-01 | End: 2024-05-08

## 2024-05-01 RX ORDER — FLUORESCEIN SODIUM AND BENOXINATE HYDROCHLORIDE 2.6; 4.4 MG/ML; MG/ML
1 SOLUTION/ DROPS OPHTHALMIC
Status: COMPLETED | OUTPATIENT
Start: 2024-05-01 | End: 2024-05-01

## 2024-05-01 RX ADMIN — FLUORESCEIN SODIUM AND BENOXINATE HYDROCHLORIDE 1 DROP: 2.6; 4.4 SOLUTION/ DROPS OPHTHALMIC at 09:05

## 2024-05-01 NOTE — LETTER
May 1, 2024    Aida Buchanan  [address] [address]             Urgent Care - [facility]  Urgent Care  [address]  [facility] [facility] [address]  Phone: [phone]   May 1, 2024     Patient: Aida Buchanan   YOB: 1995   Date of Visit: 5/1/2024       To Whom it May Concern:    Aida Buchanan was seen in my clinic on 5/1/2024. She may return to school on 05/02/24 .    Please excuse her from any work missed.    If you have any questions or concerns, please don't hesitate to call.    Sincerely,         Modesta Colby, NP

## 2024-05-01 NOTE — PROGRESS NOTES
"Subjective:      Patient ID: Aida Buchanan is a 28 y.o. female.    Vitals:  height is 5' (1.524 m) and weight is 145.7 kg (321 lb 3.4 oz) (abnormal). Her oral temperature is 98.3 °F (36.8 °C). Her blood pressure is 119/79 and her pulse is 67. Her respiration is 18 and oxygen saturation is 99%.     Chief Complaint: Eye Drainage    Patient is a 29-year-old presenting to the clinic with bilateral green eye drainage that started yesterday. No treatments tried per patient. Patient reports her son had pink eye 2 weeks ago.    Eye Problem   Both eyes are affected. This is a new problem. The current episode started yesterday. The problem occurs constantly. The problem has been unchanged. There was no injury mechanism. The pain is at a severity of 0/10. The patient is experiencing no pain ("Just uncomfortable"). There is Known exposure ("Son recently had conjunctivitis 2 weeks ago") to pink eye. She Does not wear contacts. Associated symptoms include blurred vision, an eye discharge, a foreign body sensation, photophobia and a recent URI. Pertinent negatives include no double vision, eye redness, fever, itching, nausea or vomiting. Associated symptoms comments: Blurred vision in Left eye and also feels like a foreign body is in Left eye. She has tried nothing for the symptoms.       Constitution: Negative for fever.   Eyes:  Positive for eye discharge, photophobia and blurred vision. Negative for eye itching, eye redness and double vision.   Gastrointestinal:  Negative for nausea and vomiting.      Objective:     Physical Exam   Constitutional: She is oriented to person, place, and time.  Non-toxic appearance. She does not appear ill. No distress.   HENT:   Head: Normocephalic and atraumatic.   Ears:   Right Ear: Tympanic membrane normal.   Left Ear: Tympanic membrane normal.   Nose: Congestion present.   Mouth/Throat: Mucous membranes are moist. Oropharynx is clear.   Eyes: Lids are normal. Pupils are equal, round, and " reactive to light. Lids are everted and swept, no foreign bodies found. Right eye exhibits discharge (watery at this time). No foreign body present in the right eye. Left eye exhibits discharge (watery at this time). No foreign body present in the left eye. Right conjunctiva is injected. Left conjunctiva is injected. Extraocular movement intact   Cardiovascular: Normal rate, regular rhythm, normal heart sounds and normal pulses.   Pulmonary/Chest: Effort normal and breath sounds normal. No respiratory distress. She has no wheezes.   Abdominal: Normal appearance.   Neurological: no focal deficit. She is alert and oriented to person, place, and time.   Skin: Skin is warm and not diaphoretic.   Psychiatric: Her behavior is normal. Mood normal.   Nursing note and vitals reviewed.  Vision Screening    Right eye Left eye Both eyes   Without correction 20/20 20/30 20/30   With correction         Assessment:     1. Eye drainage    2. History of itching of eye    3. Conjunctivitis of left eye, unspecified conjunctivitis type    4. Conjunctivitis of right eye, unspecified conjunctivitis type        Plan:       Eye drainage  -     fluorescein-benoxinate 0.25-0.4% ophthalmic solution 1 drop    History of itching of eye  -     fluorescein-benoxinate 0.25-0.4% ophthalmic solution 1 drop    Conjunctivitis of left eye, unspecified conjunctivitis type  -     polymyxin B sulf-trimethoprim (POLYTRIM) 10,000 unit- 1 mg/mL Drop; Place 1 drop into both eyes every 4 (four) hours. for 7 days  Dispense: 10 mL; Refill: 0    Conjunctivitis of right eye, unspecified conjunctivitis type  -     polymyxin B sulf-trimethoprim (POLYTRIM) 10,000 unit- 1 mg/mL Drop; Place 1 drop into both eyes every 4 (four) hours. for 7 days  Dispense: 10 mL; Refill: 0